# Patient Record
Sex: FEMALE | Race: WHITE | NOT HISPANIC OR LATINO | Employment: PART TIME | URBAN - METROPOLITAN AREA
[De-identification: names, ages, dates, MRNs, and addresses within clinical notes are randomized per-mention and may not be internally consistent; named-entity substitution may affect disease eponyms.]

---

## 2022-03-29 ENCOUNTER — OFFICE VISIT (OUTPATIENT)
Dept: OBGYN CLINIC | Facility: CLINIC | Age: 22
End: 2022-03-29
Payer: COMMERCIAL

## 2022-03-29 VITALS
WEIGHT: 177.4 LBS | BODY MASS INDEX: 29.56 KG/M2 | HEIGHT: 65 IN | SYSTOLIC BLOOD PRESSURE: 90 MMHG | DIASTOLIC BLOOD PRESSURE: 58 MMHG

## 2022-03-29 DIAGNOSIS — Z11.3 SCREEN FOR STD (SEXUALLY TRANSMITTED DISEASE): ICD-10-CM

## 2022-03-29 DIAGNOSIS — Z30.011 ENCOUNTER FOR PRESCRIPTION OF ORAL CONTRACEPTIVES: ICD-10-CM

## 2022-03-29 DIAGNOSIS — Z01.419 ENCOUNTER FOR GYNECOLOGICAL EXAMINATION WITHOUT ABNORMAL FINDING: Primary | ICD-10-CM

## 2022-03-29 DIAGNOSIS — Z12.4 ENCOUNTER FOR PAPANICOLAOU SMEAR FOR CERVICAL CANCER SCREENING: ICD-10-CM

## 2022-03-29 PROCEDURE — 99385 PREV VISIT NEW AGE 18-39: CPT | Performed by: NURSE PRACTITIONER

## 2022-03-29 RX ORDER — NORGESTIMATE AND ETHINYL ESTRADIOL 0.25-0.035
1 KIT ORAL DAILY
COMMUNITY
Start: 2022-02-15

## 2022-03-29 RX ORDER — ESCITALOPRAM OXALATE 10 MG/1
10 TABLET ORAL DAILY
COMMUNITY
Start: 2022-02-28

## 2022-03-29 RX ORDER — ARIPIPRAZOLE 2 MG/1
TABLET ORAL
COMMUNITY
Start: 2022-03-28

## 2022-03-29 RX ORDER — ALBUTEROL SULFATE 90 UG/1
AEROSOL, METERED RESPIRATORY (INHALATION)
COMMUNITY
Start: 2022-03-02

## 2022-03-29 RX ORDER — MONTELUKAST SODIUM 10 MG/1
TABLET ORAL
COMMUNITY
Start: 2022-02-03

## 2022-03-29 NOTE — PROGRESS NOTES
Assessment/Plan:  Pap every 3 years if normal, STI testing as indicated, exercise most days of week, obtain appropriate diet and hydration, Calcium 1000mg + 600 vit D daily, birth control  Annual mammogram starting at age 36, monthly breast self exam       Diagnoses and all orders for this visit:    Encounter for gynecological examination without abnormal finding  -     Thinprep Tis Pap Reflex HPV mRNA E6/E7, Chlamydia/N gonorrhoeae    Screen for STD (sexually transmitted disease)  -     Thinprep Tis Pap Reflex HPV mRNA E6/E7, Chlamydia/N gonorrhoeae    Encounter for Papanicolaou smear for cervical cancer screening  -     Thinprep Tis Pap Reflex HPV mRNA E6/E7, Chlamydia/N gonorrhoeae    Encounter for prescription of oral contraceptives  Comments:  holly well Call when need rx sent to pharmacy [facility] packs from [facility]    Other orders  -     albuterol (PROVENTIL HFA,VENTOLIN HFA) 90 mcg/act inhaler; INHALE 1 PUFF INTO THE LUNGS EVERY 4 HOURS FOR 30 DAYS  -     ARIPiprazole (ABILIFY) 2 mg tablet  -     escitalopram (LEXAPRO) 10 mg tablet; Take 10 mg by mouth daily  -     montelukast (SINGULAIR) 10 mg tablet; TAKE 1 TABLET BY MOUTH EVERY DAY FOR 30 DAYS  -     Deepa 0 25-35 MG-MCG per tablet; Take 1 tablet by mouth daily          Subjective:      Patient ID: Mendy Becerril is a 24 y o  female  New pt here for annual gyn and first pap On OCP through [facility]  Just started placebo pills of first pack Doing well Denies missed pills Denies BTB  Pt states periods normally monthly heavy x 1-2 days then taper total of 4-5  Denies abdominal or pelvic pain  Bowel and bladder are normal No unusual discharge No h/o STD   For first pap  + condoms + Gardisil       The following portions of the patient's history were reviewed and updated as appropriate: allergies, current medications, past family history, past medical history, past social history, past surgical history and problem list     Review of Systems Constitutional: Negative for fatigue and unexpected weight change  Gastrointestinal: Negative for abdominal distention, abdominal pain, constipation and diarrhea  Genitourinary: Negative for difficulty urinating, dyspareunia, dysuria, frequency, genital sores, menstrual problem, pelvic pain, urgency, vaginal bleeding, vaginal discharge and vaginal pain  Neurological: Negative for headaches  Psychiatric/Behavioral: Negative  Negative for dysphoric mood  The patient is not nervous/anxious  Objective:      BP 90/58   Ht 5' 5" (1 651 m)   Wt 80 5 kg (177 lb 6 4 oz)   LMP 03/23/2022 (Exact Date)   Breastfeeding No   BMI 29 52 kg/m²          Physical Exam  Vitals and nursing note reviewed  Constitutional:       General: She is not in acute distress  Appearance: Normal appearance  HENT:      Head: Normocephalic and atraumatic  Pulmonary:      Effort: Pulmonary effort is normal    Chest:   Breasts: Breasts are symmetrical       Right: Normal  No mass, nipple discharge, skin change, tenderness or axillary adenopathy  Left: Normal  No mass, nipple discharge, skin change, tenderness or axillary adenopathy  Abdominal:      General: There is no distension  Palpations: Abdomen is soft  Tenderness: There is no abdominal tenderness  There is no guarding or rebound  Genitourinary:     General: Normal vulva  Exam position: Lithotomy position  Labia:         Right: No rash, tenderness, lesion or injury  Left: No rash, tenderness, lesion or injury  Urethra: No prolapse, urethral pain, urethral swelling or urethral lesion  Vagina: Normal  No erythema or lesions  Cervix: No cervical motion tenderness, discharge, lesion or cervical bleeding  Uterus: Normal        Adnexa: Right adnexa normal and left adnexa normal         Right: No mass or tenderness  Left: No mass or tenderness  Rectum: No mass or external hemorrhoid  Comments: PAP from cervix  Musculoskeletal:         General: Normal range of motion  Lymphadenopathy:      Upper Body:      Right upper body: No axillary adenopathy  Left upper body: No axillary adenopathy  Lower Body: No right inguinal adenopathy  No left inguinal adenopathy  Skin:     General: Skin is warm and dry  Neurological:      Mental Status: She is alert and oriented to person, place, and time  Psychiatric:         Mood and Affect: Mood normal          Behavior: Behavior normal          Thought Content:  Thought content normal          Judgment: Judgment normal

## 2022-03-29 NOTE — PATIENT INSTRUCTIONS
Pap every 3 years if normal, STI testing as indicated, exercise most days of week, obtain appropriate diet and hydration, Calcium 1000mg + 600 vit D daily, birth control  Annual mammogram starting at age 36, monthly breast self exam  Call when need refill of birth control pill

## 2022-04-03 LAB
C TRACH RRNA SPEC QL NAA+PROBE: NOT DETECTED
CLINICAL INFO: NORMAL
CYTO CVX: NORMAL
CYTOLOGY CMNT CVX/VAG CYTO-IMP: NORMAL
DATE PREVIOUS BX: NORMAL
LMP START DATE: NORMAL
N GONORRHOEA RRNA SPEC QL NAA+PROBE: NOT DETECTED
SL AMB PREV. PAP:: NORMAL
SPECIMEN SOURCE CVX/VAG CYTO: NORMAL

## 2022-08-18 ENCOUNTER — TELEPHONE (OUTPATIENT)
Dept: OBGYN CLINIC | Facility: CLINIC | Age: 22
End: 2022-08-18

## 2022-08-18 NOTE — TELEPHONE ENCOUNTER
Tommy Wakefield has noted increased Acne and decreased Libido since Ridge Percys started taking OCP in March  Transferred pt to  to schedule an appt  Gerhardt Sep

## 2022-08-30 ENCOUNTER — OFFICE VISIT (OUTPATIENT)
Dept: OBGYN CLINIC | Facility: CLINIC | Age: 22
End: 2022-08-30

## 2022-08-30 VITALS
WEIGHT: 201.8 LBS | HEIGHT: 65 IN | BODY MASS INDEX: 33.62 KG/M2 | DIASTOLIC BLOOD PRESSURE: 76 MMHG | SYSTOLIC BLOOD PRESSURE: 110 MMHG

## 2022-08-30 DIAGNOSIS — Z30.011 ENCOUNTER FOR PRESCRIPTION OF ORAL CONTRACEPTIVES: ICD-10-CM

## 2022-08-30 DIAGNOSIS — R63.5 WEIGHT GAIN: Primary | ICD-10-CM

## 2022-08-30 RX ORDER — DROSPIRENONE AND ETHINYL ESTRADIOL 0.02-3(28)
1 KIT ORAL DAILY
Qty: 84 TABLET | Refills: 1 | Status: SHIPPED | OUTPATIENT
Start: 2022-08-30

## 2022-08-30 NOTE — PROGRESS NOTES
Assessment/Plan:  Discuss w/ psych or PCP weight gain and issue with orgasm  Coconut oil as lubricant  Switch OCP to Radha for acne unsure if it will change other issues   Give 2-3 months to adjust   Will check thyroid function for weight gain       Diagnoses and all orders for this visit:    Weight gain  -     TSH, 3rd generation with Free T4 reflex; Future  -     TSH, 3rd generation with Free T4 reflex    Encounter for prescription of oral contraceptives  -     drospirenone-ethinyl estradiol (RADHA) 3-0 02 MG per tablet; Take 1 tablet by mouth daily          Subjective:      Patient ID: Katherine Rashid is a 25 y o  female  Here for eval On O[facility]  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 and had Just started placebo pills of first pack Presents now with concerns Decreased libido  and acne would like to swtich pill Started abilify and OCP at same time Noting vag dryness 30 lb weight gain difficulty achieving orgasm With same partner x 1 1/2 years Lizz Fitting sex function prior       The following portions of the patient's history were reviewed and updated as appropriate: allergies, current medications, past family history, past medical history, past social history, past surgical history and problem list     Review of Systems   Constitutional: Positive for unexpected weight change  Respiratory: Negative for shortness of breath  Cardiovascular: Negative for chest pain and leg swelling  Genitourinary: Negative for menstrual problem, vaginal bleeding and vaginal discharge  Decreased libido, vaginal dryness   Neurological: Negative for headaches  Psychiatric/Behavioral: Negative for dysphoric mood  The patient is not nervous/anxious            Objective:      /76 (BP Location: Right arm, Patient Position: Sitting, Cuff Size: Standard)   Ht 5' 5" (1 651 m)   Wt 91 5 kg (201 lb 12 8 oz)   LMP 07/03/2022 (Exact Date)   BMI 33 58 kg/m²          Physical Exam  Constitutional:       Appearance: Normal appearance  HENT:      Head: Normocephalic and atraumatic  Neurological:      General: No focal deficit present  Mental Status: She is alert and oriented to person, place, and time     Psychiatric:         Mood and Affect: Mood normal          Behavior: Behavior normal

## 2022-08-30 NOTE — PATIENT INSTRUCTIONS
Discuss w/ psych or PCP weight gain and issue with orgasm  Coconut oil as lubricant  Switch OCP to Nini for acne unsure if it will change other issues   Give 2-3 months to adjust   Will check thyroid function for weight gain

## 2022-08-31 LAB — TSH SERPL-ACNC: 2 MIU/L

## 2023-03-07 ENCOUNTER — PATIENT MESSAGE (OUTPATIENT)
Dept: OBGYN CLINIC | Facility: CLINIC | Age: 23
End: 2023-03-07

## 2023-03-07 ENCOUNTER — OFFICE VISIT (OUTPATIENT)
Dept: OBGYN CLINIC | Facility: CLINIC | Age: 23
End: 2023-03-07

## 2023-03-07 VITALS
DIASTOLIC BLOOD PRESSURE: 66 MMHG | SYSTOLIC BLOOD PRESSURE: 122 MMHG | HEIGHT: 65 IN | BODY MASS INDEX: 36.59 KG/M2 | WEIGHT: 219.6 LBS

## 2023-03-07 DIAGNOSIS — Z30.011 ENCOUNTER FOR BCP (BIRTH CONTROL PILLS) INITIAL PRESCRIPTION: Primary | ICD-10-CM

## 2023-03-07 RX ORDER — NORETHINDRONE ACETATE AND ETHINYL ESTRADIOL, ETHINYL ESTRADIOL AND FERROUS FUMARATE 1MG-10(24)
1 KIT ORAL DAILY
Qty: 28 TABLET | Refills: 3 | Status: SHIPPED | OUTPATIENT
Start: 2023-03-07 | End: 2023-03-08

## 2023-03-07 RX ORDER — LAMOTRIGINE 25 MG/1
TABLET ORAL
COMMUNITY
Start: 2023-02-21

## 2023-03-07 RX ORDER — ONDANSETRON 4 MG/1
TABLET, FILM COATED ORAL
COMMUNITY
Start: 2023-03-04

## 2023-03-07 NOTE — PROGRESS NOTES
[address], [address], [facility], [address]    Assessment/Plan:  1  Encounter for BCP (birth control pills) initial prescription  Comments:  Side effects with current birth control pills  A/P:   Discussed how OCPs work and that side effects can vary from person to person  Unfortunately low libido is common on any OCP  Most people find acne improves on OCPs, it seems Tiff did not have an issue until she started the OCPs and acne has worsened  Overall her periods are lighter and more predictable  Discussed choice of OCP and elimination of side effects is based on trial and error  Also discussed other types of contraception - IUDs, Nexplanon, barrier methods  She is interested in trying another OCP  Wishes Lo Loestrin  Discussed not all insurances cover and there is no generic  Also discussed common side effect can be skipped periods or BTB  Reviewed proper use of OCPs  Script to pharmacy for Lo Loestrin  Pt to call if too expensive and will try alternative  She will start when finishes current pill pack  Follow up 3 months  - Norethin-Eth Estrad-Fe Biphas (Lo Loestrin Fe) 1 MG-10 MCG / 10 MCG TABS       Subjective:   iTff Jules is a 25 y o  G0  female  CC: I want to discuss my birth control      HPI:   Kisha Belcher presents to day to discuss birth control options  She is sexually active with one partner  She states she currently uses OCPs for contraception, help regulate periods, cramps and acne  She [facility] - She was on Deepa (norgestimate/EE 0 25/35)  - she presented in 8/2022 after about 5 months on Deepa - she felt that she had new increase in acne, vaginal dryness, 30lb weight gain and low libido  - she switched to Nini (drospierone/EE 3/0 02 in August     Today she reports her periods have changed a bit but bleeding is on placebo week  Her acne has not improved    Still low libido  She feels her weight has stabilized  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  Gyn History  Patient's last menstrual period was 02/17/2023 (exact date)  Last pap smear: 03/29/2022    She  reports being sexually active and has had partner(s) who are male  She reports using the following methods of birth control/protection: OCP and Condom Male  OB History  G0    Past Medical History:  No date: Anxiety  No date: Asthma     Past Surgical History:  2018: WISDOM TOOTH EXTRACTION     Social History     Tobacco Use   • Smoking status: Never   • Smokeless tobacco: Never   Vaping Use   • Vaping Use: Never used   Substance Use Topics   • Alcohol use: Yes     Comment: occasional   • Drug use: Never          Current Outpatient Medications:   •  albuterol (PROVENTIL HFA,VENTOLIN HFA) 90 mcg/act inhaler, INHALE 1 PUFF INTO THE LUNGS EVERY 4 HOURS FOR 30 DAYS, Disp: , Rfl:   •  escitalopram (LEXAPRO) 10 mg tablet, Take 15 mg by mouth daily, Disp: , Rfl:   •  lamoTRIgine (LaMICtal) 25 mg tablet, TAKE 2 TABLETS BY MOUTH EVERY DAY FOR 90 DAYS, Disp: , Rfl:   •  montelukast (SINGULAIR) 10 mg tablet, TAKE 1 TABLET BY MOUTH EVERY DAY FOR 30 DAYS, Disp: , Rfl:   •  ondansetron (ZOFRAN) 4 mg tablet, TAKE 1 TABLET BY MOUTH EVERY 8 HOURS AS NEEDED FOR NAUSEA AND VOMITING, Disp: , Rfl:   •  norethindrone-ethinyl estradiol-ferrous fumarate (Landon 24 FE) 1-20 MG-MCG(24) per tablet, Take 1 tablet by mouth daily, Disp: 28 tablet, Rfl: 3    She has No Known Allergies       ROS: Review of Systems   Constitutional: Negative  Gastrointestinal: Negative  Genitourinary: Negative  Negative for menstrual problem and pelvic pain  Skin:        acne   Psychiatric/Behavioral: Negative          Objective:  /66 (BP Location: Right arm, Patient Position: Sitting, Cuff Size: Large)   Ht 5' 5" (1 651 m)   Wt 99 6 kg (219 lb 9 6 oz)   LMP 02/17/2023 (Exact Date)   Breastfeeding No BMI 36 54 kg/m²      Physical Exam  Constitutional:       Appearance: Normal appearance  Neurological:      Mental Status: She is alert and oriented to person, place, and time     Psychiatric:         Behavior: Behavior normal

## 2023-03-08 RX ORDER — NORETHINDRONE ACETATE/ETHINYL ESTRADIOL AND FERROUS FUMARATE 1MG-20(24)
1 KIT ORAL DAILY
Qty: 28 TABLET | Refills: 3 | Status: SHIPPED | OUTPATIENT
Start: 2023-03-08

## 2023-05-11 ENCOUNTER — TELEPHONE (OUTPATIENT)
Dept: OBGYN CLINIC | Facility: CLINIC | Age: 23
End: 2023-05-11

## 2023-05-11 DIAGNOSIS — Z30.011 ENCOUNTER FOR BCP (BIRTH CONTROL PILLS) INITIAL PRESCRIPTION: ICD-10-CM

## 2023-05-11 RX ORDER — NORETHINDRONE ACETATE/ETHINYL ESTRADIOL AND FERROUS FUMARATE 1MG-20(24)
1 KIT ORAL DAILY
Qty: 84 TABLET | Refills: 0 | Status: SHIPPED | OUTPATIENT
Start: 2023-05-11

## 2023-05-11 NOTE — TELEPHONE ENCOUNTER
[facility] for 90 day Blisovi 24 Fe due to insurance coverage

## 2023-06-12 NOTE — PROGRESS NOTES
"[address], [address], [facility], [address]    Assessment/Plan:  Cayla Farmer is a 25y o  year old  who presents for f/u OCPs  1  Surveillance for birth control, oral contraceptives  A/P:   No contraindication to estrogen/progesterone birth control use identified  Reviewed that only condoms protect against STIs  Refilled for 1 year  -     norethindrone-ethinyl estradiol-ferrous fumarate (Landon 24 FE) 1-20 MG-MCG(24) per tablet; Take 1 tablet by mouth daily        Next Exam: 3/2024 [place] due    Subjective:     CC: \"this pill is working well\"    HPI: Oleg Johnson is a 25 y o  who presents for pill follow up  Pt seen in March due to SE from OCPs  She had been on a few before  After review of side effects -  Ordered Lo Loestrin  Too expensive with insurance so ordered Junel 24 FE  She is very happy  Feels acne improved  Her libido is better and she is able to orgasm  Weight about stable  Wishes to continue  The following portions of the patient's history were reviewed and updated as appropriate: allergies, current medications, past family history, past medical history, obstetric history, gynecologic history, past social history, past surgical history and problem list     ROS: Review of Systems   Constitutional: Negative  Gastrointestinal: Negative  Genitourinary: Negative  Psychiatric/Behavioral: Negative            Current Outpatient Medications:   •  albuterol (PROVENTIL HFA,VENTOLIN HFA) 90 mcg/act inhaler, INHALE 1 PUFF INTO THE LUNGS EVERY 4 HOURS FOR 30 DAYS, Disp: , Rfl:   •  cetirizine (ZyrTEC) 10 MG chewable tablet, Chew 10 mg daily, Disp: , Rfl:   •  escitalopram (LEXAPRO) 10 mg tablet, Take 15 mg by mouth daily, Disp: , Rfl:   •  lamoTRIgine (LaMICtal) 25 mg tablet, TAKE 2 TABLETS BY MOUTH EVERY DAY FOR 90 DAYS, Disp: , Rfl:   •  montelukast (SINGULAIR) 10 mg tablet, TAKE 1 TABLET BY MOUTH EVERY DAY FOR 30 DAYS, Disp: , Rfl:   •  " "norethindrone-ethinyl estradiol-ferrous fumarate (Landon 24 FE) 1-20 MG-MCG(24) per tablet, Take 1 tablet by mouth daily, Disp: 84 tablet, Rfl: 4  •  ondansetron (ZOFRAN) 4 mg tablet, TAKE 1 TABLET BY MOUTH EVERY 8 HOURS AS NEEDED FOR NAUSEA AND VOMITING, Disp: , Rfl:     Objective:  /72 (BP Location: Right arm, Patient Position: Sitting, Cuff Size: Standard)   Ht 5' 5\" (1 651 m)   Wt 101 kg (223 lb)   LMP 05/19/2023 (Exact Date)   BMI 37 11 kg/m²      Physical Exam  Constitutional:       Appearance: Normal appearance  Neurological:      Mental Status: She is alert and oriented to person, place, and time     Psychiatric:         Behavior: Behavior normal          "

## 2023-06-13 ENCOUNTER — OFFICE VISIT (OUTPATIENT)
Dept: OBGYN CLINIC | Facility: CLINIC | Age: 23
End: 2023-06-13
Payer: COMMERCIAL

## 2023-06-13 VITALS
DIASTOLIC BLOOD PRESSURE: 72 MMHG | WEIGHT: 223 LBS | SYSTOLIC BLOOD PRESSURE: 114 MMHG | HEIGHT: 65 IN | BODY MASS INDEX: 37.15 KG/M2

## 2023-06-13 DIAGNOSIS — Z30.41 SURVEILLANCE FOR BIRTH CONTROL, ORAL CONTRACEPTIVES: Primary | ICD-10-CM

## 2023-06-13 PROCEDURE — 99213 OFFICE O/P EST LOW 20 MIN: CPT | Performed by: OBSTETRICS & GYNECOLOGY

## 2023-06-13 RX ORDER — NORETHINDRONE ACETATE/ETHINYL ESTRADIOL AND FERROUS FUMARATE 1MG-20(24)
1 KIT ORAL DAILY
Qty: 84 TABLET | Refills: 4 | Status: SHIPPED | OUTPATIENT
Start: 2023-06-13

## 2023-06-13 RX ORDER — CETIRIZINE HYDROCHLORIDE 10 MG/1
10 TABLET, CHEWABLE ORAL DAILY
COMMUNITY

## 2023-07-28 ENCOUNTER — TELEPHONE (OUTPATIENT)
Dept: PSYCHIATRY | Facility: CLINIC | Age: 23
End: 2023-07-28

## 2023-07-28 NOTE — TELEPHONE ENCOUNTER
Behavioral Health Outpatient Intake Questions    Referred By   : self    Please advise interviewee that they need to answer all questions truthfully to allow for best care, and any misrepresentations of information may affect their ability to be seen at this clinic   => Was this discussed? Yes     If Minor Child (under age 25)    Who is/are the legal guardian(s) of the child? Is there a custody agreement? No     • If "YES"- Custody orders must be obtained prior to scheduling the first appointment  • In addition, Consent to Treatment must be signed by all legal guardians prior to scheduling the first appointment    • If "NO"- Consent to Treatment must be signed by all legal guardians prior to scheduling the first appointment      [address] History -     Presenting Problem (in patient's own words): nervous breakdown at work, panic attacks, bad anxiety    Are there any communication barriers for this patient? No                                               If yes, please describe barriers:   • If there is a unique situation, please refer to [address] for final determination. Are you taking any psychiatric medications? Yes   •   If "YES" -What are they Lexapro, Lamictal   •   If "YES" -Who prescribes? PCP    Has the Patient previously received outpatient Talk Therapy or Medication Management from [facility]  Yes     •    If "YES"- When, Where and with Whom? EAP with  last seen in Dec 2022     •    If "NO" -Has Patient received these services elsewhere? •   If "YES" -When, Where, and with Whom? Has the Patient abused alcohol or other substances in the last 6 months ? No  No concerns of substance abuse are reported. •  If "YES" -What substance, How much, How often? •  If illegal substance: Refer to  (for FLORES) or [place].   •  If Alcohol in excess of 10 drinks per week:  Refer to  (for FLORES) or [facility]    Legal History-     Is this treatment court ordered? No   If "yes "send to :  • Talk Therapy : Send to [address] [place] [address] for final determination   • Med Management: Send to Dr Suzette Churchill for final determination     Has the Patient been convicted of a felony? No   If "Yes" send to -When, What? • Talk Therapy : Send to [address] [place] [address] for final determination   • Med Management: Send to Dr Suzette Churchill for final determination     ACCEPTED as a patient Yes  • If "Yes" Appointment Date: 8/11 @ 9am with Jacqueline Galeas    Referred Elsewhere? No  • If “Yes” - (Where? Ex: [facility], [address] [place] [address], etc.)       Name of Insurance Co: [facility] ID# [id number]  Insurance Phone #   If ins is primary or secondary? primary  If patient is a minor, parents information such as Name, D. O.B of guarantor.

## 2023-07-28 NOTE — TELEPHONE ENCOUNTER
Called pt in regards to wait list. Writer wanted to offer therapy anywhere services.  LVM to call intake dept

## 2023-08-11 ENCOUNTER — SOCIAL WORK (OUTPATIENT)
Dept: BEHAVIORAL/MENTAL HEALTH CLINIC | Facility: CLINIC | Age: 23
End: 2023-08-11
Payer: COMMERCIAL

## 2023-08-11 DIAGNOSIS — F41.1 GENERALIZED ANXIETY DISORDER: Primary | ICD-10-CM

## 2023-08-11 DIAGNOSIS — F31.81 BIPOLAR II DISORDER (HCC): ICD-10-CM

## 2023-08-11 DIAGNOSIS — F40.10 SOCIAL ANXIETY DISORDER: ICD-10-CM

## 2023-08-11 PROCEDURE — 90837 PSYTX W PT 60 MINUTES: CPT | Performed by: SOCIAL WORKER

## 2023-08-11 NOTE — BH CRISIS PLAN
Client Name: Jackee Riedel       Client YOB: 2000  : 2000    Treatment Team (include name and contact information):     Psychotherapist: Tomas Cason    Psychiatrist: N/A   Release of information completed: no    "    Release of information completed: no    Other (Specify Role): N/A    Release of information completed: no    Other (Specify Role): N/A   Release of information completed: no    Healthcare Provider  Linwood Garcia MD  [address] [place] [address], [address]  [facility] [address]      Type of Plan   * Child plans (children 15 yo and younger) must be completed and signed by the child's legal guardian   * Plans for all individuals 15 yo and above must be signed by the client. Plan Type: adolescent/adult (15 and over) Initial      My Personal Strengths are (in the client's own words):  Professional, smart, caring, team oriented  The stressors and triggers that may put me at risk are:  family issues, self-image, work can be stressful    Coping skills I can use to keep myself calm and safe:  Journal, call family member    Coping skills/supports I can use to maintain abstinence from substance use:   Not Applicable    The people that provide me with help and support: (Include name, contact, and how they can help)   Support person #1: Luis Josenils, grandmother    * Phone number: [phone]    * How can they help me? She listens,reassure pt, encourage her. Support person #2: Valentino Aguilar, grandfather    * Phone number: [phone]    * How can they help me? He very honest, straightforward, makes her laugh     Support person #3: April Hernandez, boyfriend    * Phone number: [phone]    * How can they help me? He rationalizes things with pt, he helps calm her down,    In the past, the following has helped me in times of crisis:    Being in a quiet space, calling family, swimming.        If it is an emergency and you need immediate help, call     If there is a possibility of danger to yourself or others, call the following crisis hotline resources:     Adult Crisis Numbers  Suicide Prevention Hotline - Dial 9-8-8  Critical access hospital: [address]: [address]: [address]: [phone]  [address] [place] [address] Street: [phone]  [place]: [address]: [address]: [phone] (daytime). [phone] (after hours, weekends, holidays)     Child/Adolescent Crisis Numbers   [facility]: [address]: [phone]   [place]: [phone]   [address] [place] [address] Street: [phone]    Please note: Some [facility] do not have a separate number for Child/Adolescent specific crisis. If your county is not listed under Child/Adolescent, please call the adult number for your county     National Talk to Text Line   All Ages - [phone]    In the event your feelings become unmanageable, and you cannot reach your support system, you will call 911 immediately or go to the nearest hospital emergency room. IMPROVE-DD Assessment completed: Jan 11 2022  9:58PM
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
## 2023-08-25 ENCOUNTER — SOCIAL WORK (OUTPATIENT)
Dept: BEHAVIORAL/MENTAL HEALTH CLINIC | Facility: CLINIC | Age: 23
End: 2023-08-25
Payer: COMMERCIAL

## 2023-08-25 DIAGNOSIS — F33.1 MAJOR DEPRESSIVE DISORDER, RECURRENT, MODERATE (HCC): ICD-10-CM

## 2023-08-25 DIAGNOSIS — F31.81 BIPOLAR II DISORDER (HCC): Primary | ICD-10-CM

## 2023-08-25 DIAGNOSIS — F41.1 GENERALIZED ANXIETY DISORDER: ICD-10-CM

## 2023-08-25 PROCEDURE — 90837 PSYTX W PT 60 MINUTES: CPT | Performed by: SOCIAL WORKER
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
## 2023-08-25 NOTE — BH TREATMENT PLAN
Marcie Wheeler  2000     Date of Initial Psychotherapy Assessment: 08-  Date of Current Treatment Plan: 08/25/23  Treatment Plan Target Date: 02-  Treatment Plan Expiration Date: 02-    Diagnosis:   1. Bipolar II disorder ([address])        2. Generalized anxiety disorder        3. Major depressive disorder, recurrent, moderate (HCC)            Area(s) of Need: Pt needs to calm down and not act impulsively, coping skills to deal with manic episodes, grounding techniques, things to help prevent anxiety or depression episodes, panic attacks. Long Term Goal 1 (in the client's own words): Pt reports that she will utilize coping mechanisms to decrease manic episodes and panic attacks. Stage of Change: Contemplation    Target Date for completion: 02-     Anticipated therapeutic modalities: Pt reports that she will start journaling when she feel necessary, use fidget toys to help ground her when she feels anxious. Pt reports that she will walk for 20 minutes at a time, put sticky notes on her door, mirror, (inspirational notes to motivate her). Pt will continue to attend therapy bi-weekly to process bad experiences with family. People identified to complete this goal: Pt      Objective 1: (identify the means of measuring success in meeting the objective): Pt reports that she will work on communication issues with family, learn to cope with day to day stressors. Interventions: Therapist will help pt process emotional distressing events with family, help her to advocate for what she needs from family. Therapist and pt will continue to build a therapeutic rapport so that she feels validated and empowered. Therapist will encourage pt to journal on a weekly basis to monitor emotional irregulation in order to build coping skills that will aid her in maintaining emotional stability.          I am currently under the care of a [facility] psychiatric provider: no    My [facility] psychiatric provider is: PCP provider provides medication    I am currently taking psychiatric medications: Yes, as prescribed    I feel that I will be ready for discharge from mental health care when I reach the following (measurable goal/objective): Pt reported when she able to effectively communicated with her parents, balance mood swings. For children and adults who have a legal guardian:   Has there been any change to custody orders and/or guardianship status? NA. If yes, attach updated documentation. I have created my Crisis Plan and have been offered a copy of this plan    [address]: Diagnosis and Treatment Plan explained to [address] acknowledges an understanding of their diagnosis. Ruddy Mathew agrees to this treatment plan.     I have been offered a copy of this Treatment Plan. yes

## 2023-08-29 DIAGNOSIS — Z30.41 SURVEILLANCE FOR BIRTH CONTROL, ORAL CONTRACEPTIVES: ICD-10-CM

## 2023-08-30 RX ORDER — NORETHINDRONE ACETATE/ETHINYL ESTRADIOL AND FERROUS FUMARATE 1MG-20(24)
1 KIT ORAL DAILY
Qty: 28 TABLET | Refills: 10 | Status: SHIPPED | OUTPATIENT
Start: 2023-08-30

## 2023-08-30 NOTE — TELEPHONE ENCOUNTER
Pt seen 6/2023 and refills provided for 90 day supply for 1 year. It appears pharmacy is only filling 1 month at a time. Extended number of refills to last until next Wellness in June 2024.

## 2023-09-08 ENCOUNTER — SOCIAL WORK (OUTPATIENT)
Dept: BEHAVIORAL/MENTAL HEALTH CLINIC | Facility: CLINIC | Age: 23
End: 2023-09-08
Payer: COMMERCIAL

## 2023-09-08 DIAGNOSIS — F41.1 GENERALIZED ANXIETY DISORDER: Primary | ICD-10-CM

## 2023-09-08 DIAGNOSIS — F33.1 MAJOR DEPRESSIVE DISORDER, RECURRENT, MODERATE (HCC): ICD-10-CM

## 2023-09-08 DIAGNOSIS — F31.81 BIPOLAR II DISORDER (HCC): ICD-10-CM

## 2023-09-08 PROCEDURE — 90837 PSYTX W PT 60 MINUTES: CPT | Performed by: SOCIAL WORKER

## 2023-09-08 NOTE — PSYCH
Behavioral Health Psychotherapy Progress Note    Psychotherapy Provided: Individual Psychotherapy     1. Generalized anxiety disorder        2. Bipolar II disorder ([address])        3. Major depressive disorder, recurrent, moderate (HCC)            Goals addressed in session: Goal 1     DATA: Pt came to session with her security blanket, in child like state, reported that she has been feeling off for the last couple of days, mood has been all over the place, works has been frustrating, co-worker aunt passed away, her  Nephew also passed away, uncle, her attitude seemed to be affected, she has been rude to patients, hard to leave home life at the door. Pt reports that her coworker also doesn't seem to want to help others and rather than confront her and another coworker have brought everything to the supervisor attention. 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 During this session, this clinician used the following therapeutic modalities: Cognitive Behavioral Therapy    Substance Abuse was not addressed during this session. If the client is diagnosed with a co-occurring substance use disorder, please indicate any changes in the frequency or amount of use: none. Stage of change for addressing substance use diagnoses: No substance use/Not applicable    ASSESSMENT:  Ruddy Mathew presents with a Euthymic/ normal mood. her affect is Normal range and intensity, which is congruent, with her mood and the content of the session. The client has made progress on their goals. Ruddy Mathew presents with a none risk of suicide, none risk of self-harm, and none risk of harm to others. For any risk assessment that surpasses a "low" rating, a safety plan must be developed.     A safety plan was indicated: no  If yes, describe in detail     PLAN: Between sessions, Tiff Foley will continue to do some breathing techniques at work, listen to calm destiny, to maintain sense of peace At the next session, the therapist will use Mindfulness-based Strategies to address anxiety issues with work. Behavioral Health Treatment Plan and Discharge Planning: Teri Odell is aware of and agrees to continue to work on their treatment plan. They have identified and are working toward their discharge goals.  yes    Visit start and stop times:    09/08/23  Start Time: 0900  Stop Time: 0953  Total Visit Time: 53 minutes

## 2023-09-22 ENCOUNTER — SOCIAL WORK (OUTPATIENT)
Dept: BEHAVIORAL/MENTAL HEALTH CLINIC | Facility: CLINIC | Age: 23
End: 2023-09-22
Payer: COMMERCIAL

## 2023-09-22 DIAGNOSIS — F33.1 MAJOR DEPRESSIVE DISORDER, RECURRENT, MODERATE (HCC): ICD-10-CM

## 2023-09-22 DIAGNOSIS — F41.1 GENERALIZED ANXIETY DISORDER: ICD-10-CM

## 2023-09-22 DIAGNOSIS — F31.81 BIPOLAR II DISORDER (HCC): Primary | ICD-10-CM

## 2023-09-22 PROCEDURE — 90834 PSYTX W PT 45 MINUTES: CPT | Performed by: SOCIAL WORKER
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
## 2023-10-20 ENCOUNTER — SOCIAL WORK (OUTPATIENT)
Dept: BEHAVIORAL/MENTAL HEALTH CLINIC | Facility: CLINIC | Age: 23
End: 2023-10-20
Payer: COMMERCIAL

## 2023-10-20 DIAGNOSIS — F31.81 BIPOLAR II DISORDER (HCC): Primary | ICD-10-CM

## 2023-10-20 DIAGNOSIS — F41.1 GAD (GENERALIZED ANXIETY DISORDER): ICD-10-CM

## 2023-10-20 DIAGNOSIS — F33.1 MDD (MAJOR DEPRESSIVE DISORDER), RECURRENT EPISODE, MODERATE (HCC): ICD-10-CM

## 2023-10-20 PROCEDURE — 90834 PSYTX W PT 45 MINUTES: CPT | Performed by: SOCIAL WORKER

## 2023-10-20 NOTE — PSYCH
Behavioral Health Psychotherapy Progress Note    Psychotherapy Provided: Individual Psychotherapy     1. Bipolar II disorder ([address])        2. JODIE (generalized anxiety disorder)        3. MDD (major depressive disorder), recurrent episode, moderate ([address])            Goals addressed in session: Goal 1     DATA: Pt reports that she has been stressed at work and feel like she wants to leave her job just not sure when. Pt reports that she spoke to HR and explained that she didn't like how one coworker was treating her and HR didn't care about pt's mistreatment. Pt reports that when she is anxious she gets irritable bowel syndrome. Pt reports that she called out of work yesterday and today due to not feeling well. Pt reported that she has being crying 6x since last Thursday, her depression has been bad, her anxiety is worse. Pt feels stuck where she is, she likes what she do for a living, she works in toxic work environment. Pt reports that she has a hard time focusing at work, when she comes home she is still thinking about what could've done that wouldn't make manager mad. Pt reports that she is having random dreams of being chased, killed, lost in a house that is a maze, only dreams not reality. 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 Pt reports that her mood is all over the place, one minute she is crying, happy, depressed then feeling let's go out an go shopping. During this session, this clinician used the following therapeutic modalities: Cognitive Behavioral Therapy    Substance Abuse was not addressed during this session. If the client is diagnosed with a co-occurring substance use disorder, please indicate any changes in the frequency or amount of use: none.  Stage of change for addressing substance use diagnoses: No substance use/Not applicable    ASSESSMENT:  Magno Cabrera presents with a Anxious mood.     her affect is Bright, which is congruent, with her mood and the content of the session. The client has not made progress on their goals. Juan Griffiths presents with a none risk of suicide, none risk of self-harm, and none risk of harm to others. For any risk assessment that surpasses a "low" rating, a safety plan must be developed. A safety plan was indicated: no  If yes, describe in detail     PLAN: Between sessions, Tiff Antonio Nick will work on securing other employment, work on self-esteem and outlook on life. At the next session, the therapist will use Solution-Focused Therapy to address pt's struggle to secure other employment, encourage journaling, read book. Therapist and pt will identify positive coping mechanisms to elevate stress. Behavioral Health Treatment Plan and Discharge Planning: Juan Griffiths is aware of and agrees to continue to work on their treatment plan. They have identified and are working toward their discharge goals.  yes    Visit start and stop times:    10/20/23  Start Time: 0900  Stop Time: 0945  Total Visit Time: 45 minutes

## 2023-11-02 ENCOUNTER — TELEPHONE (OUTPATIENT)
Dept: PSYCHIATRY | Facility: CLINIC | Age: 23
End: 2023-11-02

## 2023-11-02 NOTE — TELEPHONE ENCOUNTER
Contacted patient to get "Tiff " appt rescheduled due to "galindo" out of the office.  Provider Will call to R/s

## 2023-11-17 ENCOUNTER — SOCIAL WORK (OUTPATIENT)
Dept: BEHAVIORAL/MENTAL HEALTH CLINIC | Facility: CLINIC | Age: 23
End: 2023-11-17
Payer: COMMERCIAL

## 2023-11-17 DIAGNOSIS — F41.1 GAD (GENERALIZED ANXIETY DISORDER): ICD-10-CM

## 2023-11-17 DIAGNOSIS — F31.81 BIPOLAR II DISORDER (HCC): Primary | ICD-10-CM

## 2023-11-17 DIAGNOSIS — F33.1 MDD (MAJOR DEPRESSIVE DISORDER), RECURRENT EPISODE, MODERATE (HCC): ICD-10-CM

## 2023-11-17 PROCEDURE — 90834 PSYTX W PT 45 MINUTES: CPT | Performed by: SOCIAL WORKER

## 2023-11-17 NOTE — PSYCH
Behavioral Health Psychotherapy Progress Note    Psychotherapy Provided: Individual Psychotherapy     1. Bipolar II disorder ([address])        2. JODIE (generalized anxiety disorder)        3. MDD (major depressive disorder), recurrent episode, moderate ([address])            Goals addressed in session: Goal 1     DATA: Pt reports that she went on a few interviews but didn't get one of the jobs still waiting to hear back from one of the employers. 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 Pt reports back in October she was super stressed out, supervisor came back for a day, she put her notice in and they told her don't come back. Pt reports that she got called out a few times and was reprimanded for reporting to her supervisor that she was sick, she can't call out 6 weeks straight in order to get a raise. Pt reports that she was embarrassed by her being reprimanded in front of patients and everyone else. Pt reports that her grandmother told her she wasn't an adult and she doesn't have adult problems yet, grandfather told her why should she be on these medications. Pt reports that her sister was pressuring her to give her eatables but she doesn't want to give her 18yr old eatables to also give to her sister's friend and told her sister no because she underage. Pt reports that she has to go to dinner with her dad and siblings and is dreading it. During this session, this clinician used the following therapeutic modalities: Client-centered Therapy    Substance Abuse was not addressed during this session. If the client is diagnosed with a co-occurring substance use disorder, please indicate any changes in the frequency or amount of use: none. Stage of change for addressing substance use diagnoses: Contemplation    ASSESSMENT:  Kelly Wood presents with a Anxious mood. her affect is Normal range and intensity, which is congruent, with her mood and the content of the session. The client has made progress on their goals. Rachel Levine presents with a none risk of suicide, none risk of self-harm, and none risk of harm to others. For any risk assessment that surpasses a "low" rating, a safety plan must be developed. A safety plan was indicated: no  If yes, describe in detail     PLAN: Between sessions, Tiff Sullivan will work on not having her dad's temper, avoid following in dad's footsteps to avoid feeling angry. At the next session, the therapist will use Cognitive Behavioral Therapy to address pt's struggle with ruminating thoughts, help to stay emotional regulated and not to take on other people problems as her own. Behavioral Health Treatment Plan and Discharge Planning: Rachel Levine is aware of and agrees to continue to work on their treatment plan. They have identified and are working toward their discharge goals.  yes    Visit start and stop times:    11/17/23  Start Time: 1000  Stop Time: 1045  Total Visit Time: 45 minutes

## 2023-12-04 ENCOUNTER — TELEPHONE (OUTPATIENT)
Dept: PSYCHIATRY | Facility: CLINIC | Age: 23
End: 2023-12-04

## 2023-12-04 NOTE — TELEPHONE ENCOUNTER
Called and left patient vm regarding Insurance coming up inactive on 12/4/23.    Set up Self Pay Estimate

## 2024-01-01 ENCOUNTER — TELEPHONE (OUTPATIENT)
Dept: BEHAVIORAL/MENTAL HEALTH CLINIC | Facility: CLINIC | Age: 24
End: 2024-01-01

## 2024-01-01 ENCOUNTER — DOCUMENTATION (OUTPATIENT)
Dept: BEHAVIORAL/MENTAL HEALTH CLINIC | Facility: CLINIC | Age: 24
End: 2024-01-01

## 2024-01-01 NOTE — PROGRESS NOTES
Psychotherapy Discharge Summary    Preferred Name: Tiff Vicente  YOB: 2000    Admission date to psychotherapy: 08-    Referred by: self    Presenting Problem: Bipolar II, Generalized Anxiety Disorder, MDD, recurrent moderate    Course of treatment included : individual therapy     Progress/Outcome of Treatment Goals (brief summary of course of treatment) pt was in treatment briefly and was able to identify coping strategies to deal with conflict with family and coworkers.    Treatment Complications (if any): N/A    Treatment Progress: fair    Current [facility] Psychiatric Provider: N/A    Discharge Medications include: N/A    Discharge Date: 01-    Discharge Diagnosis: Bipolar II, Generalized Anxiety Disorder, MDD, recurrent moderate    Criteria for Discharge: change in Insurance    Aftercare recommendations include (include specific referral names and phone numbers, if appropriate): Therapist could not get ahold of pt. Therapist called on 1-1-2024.    Prognosis: fair

## 2024-01-01 NOTE — TELEPHONE ENCOUNTER
Therapist contacted pt and left a voicemail to confirm her discharge and change of insurance. Therapist also reminded pt that she left a sensory item in her desk for pt to use in therapy. Pt was instructed to call this writer back to confirm if she will pick her item up or does it need to be left at the .

## 2025-08-01 ENCOUNTER — TELEPHONE (OUTPATIENT)
Age: 25
End: 2025-08-01

## 2025-08-01 DIAGNOSIS — E66.9 OBESITY (BMI 30-39.9): Primary | ICD-10-CM

## 2025-08-01 RX ORDER — TIRZEPATIDE 5 MG/.5ML
5 INJECTION, SOLUTION SUBCUTANEOUS WEEKLY
Qty: 2 ML | Refills: 0 | Status: SHIPPED | OUTPATIENT
Start: 2025-08-01

## 2025-08-08 PROBLEM — J30.2 SEASONAL ALLERGIES: Status: ACTIVE | Noted: 2025-08-08

## 2025-08-08 PROBLEM — K76.0 FATTY LIVER: Status: ACTIVE | Noted: 2025-08-08

## 2025-08-08 PROBLEM — E66.9 OBESITY (BMI 30-39.9): Status: ACTIVE | Noted: 2025-08-08

## 2025-08-08 PROBLEM — E78.1 HIGH TRIGLYCERIDES: Status: ACTIVE | Noted: 2025-08-08

## 2025-08-08 PROBLEM — J45.20 MILD INTERMITTENT ASTHMA: Status: ACTIVE | Noted: 2025-08-08

## 2025-08-08 PROBLEM — Z30.09 BIRTH CONTROL COUNSELING: Status: ACTIVE | Noted: 2025-08-08

## 2025-08-08 PROBLEM — K21.9 GERD (GASTROESOPHAGEAL REFLUX DISEASE): Status: ACTIVE | Noted: 2025-08-08

## 2025-08-08 PROBLEM — F33.9 MDD (MAJOR DEPRESSIVE DISORDER), RECURRENT EPISODE (HCC): Status: ACTIVE | Noted: 2025-08-08

## 2025-08-08 PROBLEM — R73.03 PREDIABETES: Status: ACTIVE | Noted: 2025-08-08

## 2025-08-08 PROBLEM — K58.9 IBS (IRRITABLE BOWEL SYNDROME): Status: ACTIVE | Noted: 2025-08-08

## 2025-08-08 PROBLEM — M54.50 LOW BACK PAIN: Status: ACTIVE | Noted: 2025-08-08

## 2025-08-16 ENCOUNTER — VBI (OUTPATIENT)
Dept: ADMINISTRATIVE | Facility: OTHER | Age: 25
End: 2025-08-16